# Patient Record
Sex: MALE
[De-identification: names, ages, dates, MRNs, and addresses within clinical notes are randomized per-mention and may not be internally consistent; named-entity substitution may affect disease eponyms.]

---

## 2018-07-13 ENCOUNTER — HOSPITAL (OUTPATIENT)
Dept: OTHER | Age: 33
End: 2018-07-13
Attending: EMERGENCY MEDICINE

## 2018-10-13 ENCOUNTER — APPOINTMENT (OUTPATIENT)
Dept: GENERAL RADIOLOGY | Facility: HOSPITAL | Age: 33
End: 2018-10-13
Payer: OTHER MISCELLANEOUS

## 2018-10-13 ENCOUNTER — HOSPITAL ENCOUNTER (EMERGENCY)
Facility: HOSPITAL | Age: 33
Discharge: HOME | End: 2018-10-13
Attending: EMERGENCY MEDICINE
Payer: OTHER MISCELLANEOUS

## 2018-10-13 VITALS
SYSTOLIC BLOOD PRESSURE: 122 MMHG | BODY MASS INDEX: 43.69 KG/M2 | WEIGHT: 295 LBS | DIASTOLIC BLOOD PRESSURE: 82 MMHG | TEMPERATURE: 99 F | HEIGHT: 69 IN | OXYGEN SATURATION: 96 % | RESPIRATION RATE: 18 BRPM | HEART RATE: 85 BPM

## 2018-10-13 DIAGNOSIS — S46.912A STRAIN OF LEFT SHOULDER, INITIAL ENCOUNTER: Primary | ICD-10-CM

## 2018-10-13 PROCEDURE — 73030 X-RAY EXAM OF SHOULDER: CPT | Performed by: EMERGENCY MEDICINE

## 2018-10-13 PROCEDURE — 99283 EMERGENCY DEPT VISIT LOW MDM: CPT

## 2018-10-13 RX ORDER — NAPROXEN 500 MG/1
500 TABLET ORAL 2 TIMES DAILY PRN
Qty: 14 TABLET | Refills: 0 | Status: SHIPPED | OUTPATIENT
Start: 2018-10-13 | End: 2018-10-20

## 2018-10-13 NOTE — ED PROVIDER NOTES
Patient Seen in: [facility] AND [facility] Emergency Department    History   Patient presents with:  Upper Extremity Injury (musculoskeletal)    Stated Complaint:     HPI    Patient is a 27-year-old male who injured his left shoulder at work.   He states he was l 10/13/2018 at 7:11     Approved by (CST): Amaya Erazo MD on 10/13/2018 at 7:11                      Disposition and Plan     Clinical Impression:  Strain of left shoulder, initial encounter  (primary encounter diagnosis)    Disposition:  Discharge  10/1

## 2018-10-13 NOTE — ED INITIAL ASSESSMENT (HPI)
PT c/o left shoulder injury at work at appx 0300 when lifting a \"[employer]\" freezer door. Has pain to affected shoulder with no obvious deformity or signs of trauma.

## 2018-10-16 ENCOUNTER — APPOINTMENT (OUTPATIENT)
Dept: OTHER | Facility: HOSPITAL | Age: 33
End: 2018-10-16
Attending: ORTHOPAEDIC SURGERY

## 2024-09-02 ENCOUNTER — HOSPITAL ENCOUNTER (EMERGENCY)
Facility: HOSPITAL | Age: 39
Discharge: HOME | End: 2024-09-02
Attending: EMERGENCY MEDICINE
Payer: OTHER MISCELLANEOUS

## 2024-09-02 VITALS
RESPIRATION RATE: 18 BRPM | HEIGHT: 70 IN | DIASTOLIC BLOOD PRESSURE: 94 MMHG | WEIGHT: 315 LBS | BODY MASS INDEX: 45.1 KG/M2 | TEMPERATURE: 98 F | OXYGEN SATURATION: 96 % | HEART RATE: 84 BPM | SYSTOLIC BLOOD PRESSURE: 141 MMHG

## 2024-09-02 DIAGNOSIS — S66.911A WRIST STRAIN, RIGHT, INITIAL ENCOUNTER: Primary | ICD-10-CM

## 2024-09-02 PROCEDURE — 99283 EMERGENCY DEPT VISIT LOW MDM: CPT

## 2024-09-02 NOTE — ED INITIAL ASSESSMENT (HPI)
Was carrying boxes and all of a sudden developed right hand pain, described as a falling alseep feeling worsening with movement, Denies any trauma. BE FAST -.

## 2024-09-02 NOTE — ED PROVIDER NOTES
Patient Seen in: [facility] Emergency Department    History     Chief Complaint   Patient presents with    Arm or Hand Injury       HPI    The patient presents to the ED complaining of pain in his right hand that started while he was lifting boxes at work today.  He states that he has slight tingling to the right hand as well but states that he can still feel things and has no weakness to the hand.  Denies other complaints.    History reviewed. History reviewed. No pertinent past medical history.    History reviewed. History reviewed. No pertinent surgical history.      Medications :  (Not in a hospital admission)       History reviewed. No pertinent family history.    Smoking Status:   Social History     Socioeconomic History    Marital status:    Tobacco Use    Smoking status: Never    Smokeless tobacco: Current     Types: Chew   Vaping Use    Vaping status: Some Days   Substance and Sexual Activity    Alcohol use: Yes     Alcohol/week: 3.0 standard drinks of alcohol     Types: 3 Standard drinks or equivalent per week    Drug use: Never       Constitutional and vital signs reviewed.      Social History and Family History elements reviewed from today, pertinent positives to the presenting problem noted.    Physical Exam     ED Triage Vitals [09/02/24 0449]   /87   Pulse 90   Resp 16   Temp    Temp src    SpO2 96 %   O2 Device None (Room air)       All measures to prevent infection transmission during my interaction with the patient were taken. Handwashing was performed prior to and after the exam.  Stethoscope and any equipment used during my examination was cleaned with super sani-cloth germicidal wipes following the exam.     Physical Exam  Constitutional:       Appearance: Normal appearance.   Cardiovascular:      Pulses: Normal pulses.   Pulmonary:      Effort: Pulmonary effort is normal. No respiratory distress.   Musculoskeletal:         General: Tenderness present. No swelling or  deformity.      Comments: To the right medial wrist over the ulnar wrist.  No bony deformity.  Normal range of motion of the wrist and normal strength in the right hand and fingers.  Patient has mild subjective tingling to the hand.  Cap refill intact and less than 2 seconds and normal radial pulse.   Skin:     General: Skin is warm and dry.   Neurological:      Mental Status: He is alert. Mental status is at baseline.   Psychiatric:         Mood and Affect: Mood normal.         Behavior: Behavior normal.         ED Course      Labs Reviewed - No data to display    As Interpreted by me    Imaging Results Available and Reviewed while in ED: No results found.  ED Medications Administered: Medications - No data to display      MDM     Vitals:    09/02/24 0449   BP: 130/87   Pulse: 90   Resp: 16   SpO2: 96%   Weight: (!) 145.2 kg   Height: 177.8 cm (5' 10\")     *I personally reviewed and interpreted all ED vitals.    Pulse Ox: 96%, Room air, Normal     Differential Diagnosis/ Diagnostic Considerations: Wrist pain, other    Complicating Factors: The patient already has does not have a problem list on file. to contribute to the complexity of this ED evaluation.    Medical Decision Making  The patient presents to the ED with pain in his right wrist likely due to a strain injury.  Pain started while lifting boxes.  Tenderness to the soft tissues of the medial wrist on exam, no bony deformity and no significant neurodeficits.  Patient reassured and stable for discharge with a Velcro splint and outpatient Occupational health follow-up.      Problems Addressed:  Wrist strain, right, initial encounter: acute illness or injury        Condition upon leaving the department: Stable    Disposition and Plan     Clinical Impression:  1. Wrist strain, right, initial encounter        Disposition:  Discharge    Follow-up:  [facility]  [address]  [facility] [address]  [phone]  Schedule an  appointment as soon as possible for a visit in 2 day(s)        Medications Prescribed:  There are no discharge medications for this patient.

## 2025-06-27 ENCOUNTER — APPOINTMENT (OUTPATIENT)
Dept: CT IMAGING | Facility: HOSPITAL | Age: 40
End: 2025-06-27
Attending: EMERGENCY MEDICINE
Payer: COMMERCIAL

## 2025-06-27 ENCOUNTER — HOSPITAL ENCOUNTER (EMERGENCY)
Facility: HOSPITAL | Age: 40
Discharge: HOME | End: 2025-06-27
Attending: EMERGENCY MEDICINE
Payer: COMMERCIAL

## 2025-06-27 ENCOUNTER — OFFICE VISIT (OUTPATIENT)
Dept: FAMILY MEDICINE CLINIC | Facility: CLINIC | Age: 40
End: 2025-06-27
Payer: COMMERCIAL

## 2025-06-27 VITALS
RESPIRATION RATE: 16 BRPM | TEMPERATURE: 97 F | SYSTOLIC BLOOD PRESSURE: 128 MMHG | DIASTOLIC BLOOD PRESSURE: 84 MMHG | OXYGEN SATURATION: 97 % | HEART RATE: 65 BPM

## 2025-06-27 VITALS
OXYGEN SATURATION: 96 % | HEART RATE: 70 BPM | TEMPERATURE: 98 F | WEIGHT: 315 LBS | RESPIRATION RATE: 16 BRPM | SYSTOLIC BLOOD PRESSURE: 152 MMHG | HEIGHT: 70 IN | BODY MASS INDEX: 45.1 KG/M2 | DIASTOLIC BLOOD PRESSURE: 88 MMHG

## 2025-06-27 DIAGNOSIS — K59.00 CONSTIPATION, UNSPECIFIED CONSTIPATION TYPE: Primary | ICD-10-CM

## 2025-06-27 DIAGNOSIS — R10.12 ABDOMINAL PAIN, LEFT UPPER QUADRANT: ICD-10-CM

## 2025-06-27 DIAGNOSIS — R10.12 LUQ PAIN: Primary | ICD-10-CM

## 2025-06-27 LAB
ALBUMIN SERPL-MCNC: 4.7 G/DL (ref 3.2–4.8)
ALP LIVER SERPL-CCNC: 100 U/L (ref 45–117)
ALT SERPL-CCNC: 96 U/L (ref 10–49)
ANION GAP SERPL CALC-SCNC: 7 MMOL/L (ref 0–18)
AST SERPL-CCNC: 52 U/L (ref ?–34)
BASOPHILS # BLD AUTO: 0.04 X10(3) UL (ref 0–0.2)
BASOPHILS NFR BLD AUTO: 0.6 %
BILIRUB DIRECT SERPL-MCNC: 0.3 MG/DL (ref ?–0.3)
BILIRUB SERPL-MCNC: 0.8 MG/DL (ref 0.3–1.2)
BUN BLD-MCNC: 8 MG/DL (ref 9–23)
BUN/CREAT SERPL: 6.7 (ref 10–20)
CALCIUM BLD-MCNC: 9.5 MG/DL (ref 8.7–10.4)
CHLORIDE SERPL-SCNC: 99 MMOL/L (ref 98–112)
CO2 SERPL-SCNC: 31 MMOL/L (ref 21–32)
CREAT BLD-MCNC: 1.19 MG/DL (ref 0.7–1.3)
DEPRECATED RDW RBC AUTO: 42.1 FL (ref 35.1–46.3)
EGFRCR SERPLBLD CKD-EPI 2021: 79 ML/MIN/1.73M2 (ref 60–?)
EOSINOPHIL # BLD AUTO: 0.16 X10(3) UL (ref 0–0.7)
EOSINOPHIL NFR BLD AUTO: 2.2 %
ERYTHROCYTE [DISTWIDTH] IN BLOOD BY AUTOMATED COUNT: 12.8 % (ref 11–15)
GLUCOSE BLD-MCNC: 102 MG/DL (ref 70–99)
HCT VFR BLD AUTO: 46.1 % (ref 39–53)
HGB BLD-MCNC: 16.6 G/DL (ref 13–17.5)
IMM GRANULOCYTES # BLD AUTO: 0.01 X10(3) UL (ref 0–1)
IMM GRANULOCYTES NFR BLD: 0.1 %
LIPASE SERPL-CCNC: 44 U/L (ref 12–53)
LYMPHOCYTES # BLD AUTO: 2.13 X10(3) UL (ref 1–4)
LYMPHOCYTES NFR BLD AUTO: 29.9 %
MCH RBC QN AUTO: 32.3 PG (ref 26–34)
MCHC RBC AUTO-ENTMCNC: 36 G/DL (ref 31–37)
MCV RBC AUTO: 89.7 FL (ref 80–100)
MONOCYTES # BLD AUTO: 0.64 X10(3) UL (ref 0.1–1)
MONOCYTES NFR BLD AUTO: 9 %
NEUTROPHILS # BLD AUTO: 4.15 X10 (3) UL (ref 1.5–7.7)
NEUTROPHILS # BLD AUTO: 4.15 X10(3) UL (ref 1.5–7.7)
NEUTROPHILS NFR BLD AUTO: 58.2 %
OSMOLALITY SERPL CALC.SUM OF ELEC: 283 MOSM/KG (ref 275–295)
PLATELET # BLD AUTO: 259 10(3)UL (ref 150–450)
POTASSIUM SERPL-SCNC: 4.1 MMOL/L (ref 3.5–5.1)
PROT SERPL-MCNC: 7.9 G/DL (ref 5.7–8.2)
RBC # BLD AUTO: 5.14 X10(6)UL (ref 4.3–5.7)
SODIUM SERPL-SCNC: 137 MMOL/L (ref 136–145)
WBC # BLD AUTO: 7.1 X10(3) UL (ref 4–11)

## 2025-06-27 PROCEDURE — 85025 COMPLETE CBC W/AUTO DIFF WBC: CPT | Performed by: EMERGENCY MEDICINE

## 2025-06-27 PROCEDURE — 3077F SYST BP >= 140 MM HG: CPT | Performed by: NURSE PRACTITIONER

## 2025-06-27 PROCEDURE — 93010 ELECTROCARDIOGRAM REPORT: CPT

## 2025-06-27 PROCEDURE — 99284 EMERGENCY DEPT VISIT MOD MDM: CPT

## 2025-06-27 PROCEDURE — 83690 ASSAY OF LIPASE: CPT

## 2025-06-27 PROCEDURE — 80048 BASIC METABOLIC PNL TOTAL CA: CPT

## 2025-06-27 PROCEDURE — 80076 HEPATIC FUNCTION PANEL: CPT | Performed by: EMERGENCY MEDICINE

## 2025-06-27 PROCEDURE — 3079F DIAST BP 80-89 MM HG: CPT | Performed by: NURSE PRACTITIONER

## 2025-06-27 PROCEDURE — 80048 BASIC METABOLIC PNL TOTAL CA: CPT | Performed by: EMERGENCY MEDICINE

## 2025-06-27 PROCEDURE — 85025 COMPLETE CBC W/AUTO DIFF WBC: CPT

## 2025-06-27 PROCEDURE — 83690 ASSAY OF LIPASE: CPT | Performed by: EMERGENCY MEDICINE

## 2025-06-27 PROCEDURE — 74177 CT ABD & PELVIS W/CONTRAST: CPT | Performed by: EMERGENCY MEDICINE

## 2025-06-27 PROCEDURE — 99215 OFFICE O/P EST HI 40 MIN: CPT | Performed by: NURSE PRACTITIONER

## 2025-06-27 PROCEDURE — 3008F BODY MASS INDEX DOCD: CPT | Performed by: NURSE PRACTITIONER

## 2025-06-27 PROCEDURE — 93005 ELECTROCARDIOGRAM TRACING: CPT

## 2025-06-27 PROCEDURE — 96374 THER/PROPH/DIAG INJ IV PUSH: CPT

## 2025-06-27 RX ORDER — FAMOTIDINE 20 MG/1
20 TABLET, FILM COATED ORAL 2 TIMES DAILY PRN
Qty: 30 TABLET | Refills: 0 | Status: SHIPPED | OUTPATIENT
Start: 2025-06-27 | End: 2025-07-27

## 2025-06-27 RX ORDER — FAMOTIDINE 10 MG/ML
20 INJECTION, SOLUTION INTRAVENOUS ONCE
Status: COMPLETED | OUTPATIENT
Start: 2025-06-27 | End: 2025-06-27

## 2025-06-27 NOTE — PROGRESS NOTES
Harvey Lucero is a 40 year old male who presents to [facility] with c/o LUQ pain for the past 4-5 days, nausea, yellow mucous on toilet paper after BM this morning.  Pain letting up some this morning but still there, 3/10.  Denies vomiting, diarrhea, loss of appetite, urinary complaints, or constipation.  Pt admits to alcohol abuse, drinks about 3 24 oz beers/day.  Tried pepe seltzer without relief.  Denies any abdominal surgeries.      Upon triage, pt is stable/no distress and well-appearing.  BS's present x4. +LUQ tenderness with palpation.  No CVAT.  Accompanied by: Mother in waiting room  After triage, higher acuity of care was recommended to Harvey Lucero today.   Rationale: LUQ pain  Site recommendation: [facility] ED  Mother is driving pt, declines EMS.  Patient/parent verbalized understanding of rationale for further evaluation and was stable upon discharge.  Electronically signed,   Elizabeth Mark MSN, APN, FNP-BC  6/28/2025, 9:01 AM

## 2025-06-27 NOTE — ED INITIAL ASSESSMENT (HPI)
Patient sent from PCP office for evaluation of LUQ pain progressively worsening, nausea starting today.

## 2025-06-29 LAB
ATRIAL RATE: 72 BPM
P AXIS: 24 DEGREES
P-R INTERVAL: 194 MS
Q-T INTERVAL: 404 MS
QRS DURATION: 90 MS
QTC CALCULATION (BEZET): 442 MS
R AXIS: -8 DEGREES
T AXIS: 23 DEGREES
VENTRICULAR RATE: 72 BPM

## 2025-07-01 NOTE — ED PROVIDER NOTES
Patient Seen in: [facility] Emergency Department      The following individual(s) verbally consented to be recorded using ambient AI listening technology and understand that they can each withdraw their consent to this listening technology at any point by asking the clinician to turn off or pause the recording:    Patient name: Harvey Lucero        History  Chief Complaint   Patient presents with    Abdomen/Flank Pain     Stated Complaint: LUQ x4-5 days, nausea, +alcohol abuse. Transfer from [facility].    Subjective:   HPI     Harvey Lucero is a 40 year old male who presents with left-sided abdominal pain and nausea.    He has been experiencing left-sided abdominal pain located below the rib cage for about a week. Initially intermittent, the pain has become constant since today. It does not radiate to the back or chest and is not associated with breathing or eating. The pain becomes more noticeable after work but does not interfere with his ability to work.    He also experiences nausea, which is a new symptom prompting today's visit. No vomiting, diarrhea, constipation, or changes in urination are reported. He has not experienced any sweating beyond what is expected from physical activity or environmental heat.    He denies any history of similar pain or conditions such as diverticulitis. There is no recent history of travel or prolonged immobility, and he has not undergone any recent surgeries. He is not currently taking any medications and has no known diagnoses such as diabetes or hypertension.    No symptoms such as vomiting, diarrhea, constipation, painful urination, sore throat, shortness of breath, or increased fatigue. He also denies any recent illnesses or unusual dietary intake.    He admits to heavy etoh use, trying to cut back.        Objective:     History reviewed. No pertinent past medical history.           History reviewed. No pertinent surgical history.             Social History      Socioeconomic History    Marital status:    Tobacco Use    Smoking status: Never    Smokeless tobacco: Current     Types: Chew   Vaping Use    Vaping status: Some Days   Substance and Sexual Activity    Alcohol use: Yes     Alcohol/week: 3.0 standard drinks of alcohol     Types: 3 Standard drinks or equivalent per week    Drug use: Never                                Physical Exam    ED Triage Vitals [06/27/25 1715]   BP (!) 150/104   Pulse 71   Resp 20   Temp 97.2 °F (36.2 °C)   Temp src Temporal   SpO2 96 %   O2 Device None (Room air)       Current Vitals:   No data recorded    Pertinent physical exam:      ABDOMEN: Tenderness in the left upper quadrant of the abdomen.       Physical Exam  Vitals and nursing note reviewed.   Constitutional:       General: He is not in acute distress.     Appearance: Normal appearance. He is well-developed. He is not ill-appearing, toxic-appearing or diaphoretic.   HENT:      Head: Normocephalic and atraumatic.   Eyes:      Conjunctiva/sclera: Conjunctivae normal.      Pupils: Pupils are equal, round, and reactive to light.   Cardiovascular:      Rate and Rhythm: Normal rate and regular rhythm.      Pulses: Normal pulses.      Heart sounds: Normal heart sounds. No murmur heard.  Pulmonary:      Effort: Pulmonary effort is normal. No respiratory distress.      Breath sounds: Normal breath sounds. No wheezing.   Abdominal:      General: There is no distension.      Palpations: Abdomen is soft.      Tenderness: There is abdominal tenderness in the left upper quadrant. There is no guarding.   Musculoskeletal:         General: No tenderness. Normal range of motion.      Cervical back: Full passive range of motion without pain, normal range of motion and neck supple. No rigidity. Normal range of motion.      Right lower leg: No edema.      Left lower leg: No edema.   Skin:     General: Skin is warm and dry.      Findings: No rash.   Neurological:      Mental Status: He is  alert and oriented to person, place, and time.      GCS: GCS eye subscore is 4. GCS verbal subscore is 5. GCS motor subscore is 6.      Sensory: Sensation is intact. No sensory deficit.      Motor: Motor function is intact. No weakness.   Psychiatric:         Attention and Perception: Attention normal.         Mood and Affect: Mood normal.         Behavior: Behavior normal. Behavior is cooperative.               ED Course  Labs Reviewed   BASIC METABOLIC PANEL (8) - Abnormal; Notable for the following components:       Result Value    Glucose 102 (*)     BUN 8 (*)     BUN/CREA Ratio 6.7 (*)     All other components within normal limits   HEPATIC FUNCTION PANEL (7) - Abnormal; Notable for the following components:    AST 52 (*)     ALT 96 (*)     All other components within normal limits   LIPASE - Normal   CBC WITH DIFFERENTIAL WITH PLATELET     EKG    Rate, intervals and axes as noted on EKG Report.  Rate: 72  Rhythm: Sinus Rhythm  Reading: NSR                           MDM     Pulse Ox: 97%, Normal, RA    Cardiac Monitor:   Pulse Readings from Last 1 Encounters:   06/27/25 65   , sinus, normal for rate and rhythm       Radiology findings:     CT ABDOMEN+PELVIS(CONTRAST ONLY)(CPT=74177)  Result Date: 6/27/2025  CONCLUSION: No acute finding. Marked hepatic steatosis. Large amount of stool in colon suggests constipation. Electronically Verified and Signed by Attending Radiologist: Laron Grullon MD 6/27/2025 9:03 PM Workstation: [id number]        Chronic Medical Conditions Potentially Contributing: none      Medications   famotidine (Pepcid) 20 mg/2mL injection 20 mg (20 mg Intravenous Given 6/27/25 1950)   iopamidol 76% (ISOVUE-370) injection for power injector (80 mL Intravenous Given 6/27/25 2003)     Pt with LUQ pain, CT with constipation. Hepatic steatosis likely from heavy etoh use, nonacute.  Discussed otc and prescription constipation medications, push oral hydration, return precautions.       Medical  Decision Making      Disposition and Plan     Clinical Impression:  1. Constipation, unspecified constipation type    2. Abdominal pain, left upper quadrant         Disposition:  Discharge  6/27/2025  9:31 pm    Follow-up:  [address]  [facility] [address] [phone]    Schedule an appointment as soon as possible for a visit  Call for next available appointment          Medications Prescribed:  Discharge Medication List as of 6/27/2025  9:38 PM        START taking these medications    Details   famotidine (PEPCID) 20 MG Oral Tab Take 1 tablet (20 mg total) by mouth 2 (two) times daily as needed for Heartburn., Normal, Disp-30 tablet, R-0      Magnesium Citrate Oral Solution Take 296 mL by mouth once for 1 dose., Normal, Disp-296 mL, R-0                   Supplementary Documentation:

## (undated) NOTE — ED AVS SNAPSHOT
Jens Main   MRN: [id number]    Department:  [facility] Emergency Department   Date of Visit:  10/13/2018           Disclosure     Insurance plans vary and the physician(s) referred by the ER may not be covered by your plan.  Please contac CARE PHYSICIAN AT ONCE OR RETURN IMMEDIATELY TO THE EMERGENCY DEPARTMENT. If you have been prescribed any medication(s), please fill your prescription right away and begin taking the medication(s) as directed.   If you believe that any of the medications

## (undated) NOTE — LETTER
Date & Time: 9/2/2024, 5:10 AM  Patient: Harvey Lucero  Encounter Provider(s):    Jb Bradley MD       To Whom It May Concern:    Harvey Lucero was seen and treated in our department on 9/2/2024. He may return to work but is unable to use his right arm until cleared by occupational health.    If you have any questions or concerns, please do not hesitate to call.        _____________________________  Physician/APC Signature

## (undated) NOTE — LETTER
Date & Time: 10/13/2018, 7:07 AM  Patient: Brad Odom  Encounter Provider(s):    Kinjal Garcia MD       To Whom It May Concern: Narcisa Nelson was seen and treated in our department on 10/13/2018. He .     If you have any questions or concerns, klaudia

## (undated) NOTE — LETTER
Date & Time: 10/13/2018, 6:40 AM  Patient: Brad Odom  Encounter Provider(s):    Kinjal Garcia MD       To Whom It May Concern: Narcisa Nelson was seen and treated in our department on 10/13/2018. He can return to work in a light-duty capacity.   He